# Patient Record
(demographics unavailable — no encounter records)

---

## 2025-05-27 NOTE — HISTORY OF PRESENT ILLNESS
[de-identified] : weight check [FreeTextEntry6] : Baby is here today for a weight check.  Drinking 2-3 ozs of breast milk and formula.  Wetting diapers and having regular BMs.

## 2025-06-03 NOTE — DISCUSSION/SUMMARY
[FreeTextEntry1] : Reassure good weight gain.  Breast milk and formula po ad taryn. Silver nitrate applied to umbilicus. Recommend supportive care including humidifier and nasal saline followed by nasal suction. Return if symptoms worsen or persist.  Return at 1 month for PE.

## 2025-06-03 NOTE — HISTORY OF PRESENT ILLNESS
[de-identified] : Weight re-check [FreeTextEntry6] : Baby is here to day for a weight check.  Feeding well breast milk and formula..  Wetting diapers and having regular BMs. He does seem to be a bit congested and has a slight cough, no fevers.   His grandparents have colds. Also the umbilical cord has a yellow d/c.

## 2025-06-03 NOTE — PHYSICAL EXAM
[Soft] : soft [NL] : warm, clear [Distended] : nondistended [Normal Bowel Sounds] : abnormal bowel sounds [Hepatosplenomegaly] : no hepatosplenomegaly [FreeTextEntry9] : + umbilical granuloma

## 2025-06-27 NOTE — HISTORY OF PRESENT ILLNESS
[FreeTextEntry1] : Ameya Li  is a 1 month old male seen for initial diagnostic ABR evaluation following failed  hearing screening bilaterally at birth (born at [facility]). Patient was born full term, NICU stay was denied. Mother reported family history of bilateral HL in maternal side cousin (patient's second cousin) since birth.  Mother noted family member to be Deaf since birth. Patient has two older siblings who have passed their  hearing screenings.

## 2025-06-27 NOTE — PROCEDURE
[1000 Hz] : 1000 Hz [] : Auditory Brainstem Response: [___dBnHL] : 2000 Hz: [unfilled] dBnHL [Natural Sleep] : natural sleep [Did Not Sleep] : did not sleep [ABR responses to ___/sec] : responses to [unfilled] /sec [Normal Eardrum Mobility] : consistent with restricted eardrum mobility  [de-identified] : CNT right ear

## 2025-06-27 NOTE — ASSESSMENT
[FreeTextEntry1] : ABR is not a true test of hearing; it is an objective test that measures brainstem activity in response to acoustic stimuli. ABR evaluates the integrity of the hearing system from the level of the cochlea up through the lower brainstem. From this, we are able to gather data to estimate hearing thresholds. Please note thresholds are reported in dBnHL. Diagnostic statement includes a correction factor of -20 dB at 500Hz,-15 dB at 1000Hz, -10dB at 2000Hz, and -5dB at 4000Hz.  Tympanometry suggested abnormal middle ear function in the left ear via 1000 Hz probe tone, CNT right ear.   *Note: Patient was noisy and congested for testing today. Patient with difficulty falling/staying asleep. Patient was snoring while asleep today. Insert earphones were replaced multiple times as they continuously fell out of ears due to patient movement.   Right ear: Could not obtain reliable results in the right ear due to patient state of arousal. Possible response obtained in the mild hearing loss range at 2000 Hz which could rule out greater than a mild hearing loss at that frequency however could not be repeated and could not test at lower intensity. Could not obtain results at 500 Hz and 4000 Hz.   Left ear: Estimated hearing thresholds at 2000 Hz obtained in the normal hearing range. Greater than a mild hearing loss can be rules out at 4000 Hz at this time. Could not test below 30 dBnHL at 4000 Hz as patient woke up and was very noisy for testing. Unable to obtain 500 Hz due to patient state of arousal.

## 2025-06-27 NOTE — PLAN
[FreeTextEntry2] : 1. Continuation of ABR to obtain further frequency specific information.  2. Further audiologic recommendations to be made pending results of further testing,

## 2025-07-17 NOTE — ASSESSMENT
[FreeTextEntry1] : ABR is an an objective test that measures brainstem activity in response to acoustic stimuli. It evaluates the integrity of the hearing system from the level of the cochlea through the lower brainstem. From this, we are able to gather data to estimate hearing thresholds. Please note thresholds are reported in dBnHL. Diagnostic statement includes a correction factor of -20 dB at 500Hz. -15 dB at 1000Hz, -10 dB at 2000Hz and -5 dB at 4000Hz.   Estimated hearing thresholds obtained within normal range 500 and 2-4kHz bilaterally.

## 2025-07-17 NOTE — CONSULT LETTER
[Dear  ___] : Dear  [unfilled], [Consult Letter:] : I had the pleasure of evaluating your patient, [unfilled]. [Please see my note below.] : Please see my note below. [Consult Closing:] : Thank you very much for allowing me to participate in the care of this patient.  If you have any questions, please do not hesitate to contact me. [Sincerely,] : Sincerely, [FreeTextEntry3] : Neville Camp CCC-A Audiology Supervisor  [facility] [address] [place] [address] [phone]

## 2025-07-17 NOTE — HISTORY OF PRESENT ILLNESS
[FreeTextEntry1] : Ameya is a 1 month old male seen for repeat diagnostic ABR evaluation following failed  hearing screening bilaterally at birth.  Mother reported family history of bilateral HL in maternal side cousin (patient's second cousin) since birth. Patient has two older siblings who have passed their  hearing screenings.  ABR performed 25 limited to short sleep window and noisy breathing, results borderline/mild and abnormal immittance in left ear

## 2025-07-17 NOTE — PLAN
[FreeTextEntry2] : Repeat audio eval as medically indicated or if a change in hearing is suspected.